# Patient Record
Sex: MALE | Race: WHITE
[De-identification: names, ages, dates, MRNs, and addresses within clinical notes are randomized per-mention and may not be internally consistent; named-entity substitution may affect disease eponyms.]

---

## 2017-02-23 NOTE — XMS REPORT
Continuity of Care Document

 Created on: 12/15/2014



NEIL GOODSON

External Reference #: [id number]

: 1960

Sex: Male



Demographics







 Address  [address] [place]  [address]

 

 Home Phone  [phone]

 

 Preferred Language  English

 

 Marital Status  Unknown

 

 Tenriism Affiliation  Unknown

 

 Race  Unknown

 

 Ethnic Group  Unknown





Author







 Author  [facility]

 

 Organization  [facility]

 

 Address  Unknown

 

 Phone  Unavailable







Support







 Name  Relationship  Address  Phone

 

 LUIS FERNANDO ZULUAGA MD  Caregiver  [address] [place]  [address] [phone]

 

 GABRIEL MCKEON MD  Caregiver  [address] [place]  [address] [phone]

 

 MADDIE YE MD  Caregiver  [address] [place] [address]  [phone]

 

 EVELIO GOODSON  Next Of Kin  [address] [place], [place]  [phone]







Care Team Providers







 Care Team Member Name  Role  Phone

 

 GABRIEL MCKEON MD  PCP  [phone]







Insurance Providers







 Payer Name  Policy Number  Subscriber Name  Relationship

 

 [employer]  [id number]  Neil Goodson  18 Self / Same As Patient

 

 [facility]  [id number]  Neil Goodson  18 Self / Same As Patient







Advance Directives







 Directive  Response  Recorded Date/Time

 

 Advance Directives  No  14 9:09am

 

 Health Care Power of   No  14 9:09am

 

 Organ Donor  No  14 9:09am

 

 Resuscitation Status  Full Code  14 9:09am







Problems

Medical Problems





 Problem  Onset Date  Status

 

 muscle spasm back  Unknown  Active

 

 Conjunctivitis  Unknown  Active







Medications







 Medication  Dose  Route  Sig  Days/Qty  Instructions  Order Date  Discontinued 
Date  Status

 

 Duloxetine HCl  60 Mg  PO  DAILY        11     Active

 

 Ranitidine Hcl  150 Mg  PO  BEDTIME        11  Discontinued

 

 Tramadol Hcl  50 Mg  PO           11  Discontinued

 

 HCTZ/Lisinopril (Zestoretic)  1 Each  PO  DAILY        11  
Discontinued

 

 Dipyridamole/Aspirin  1 Ea  PO  TWICE A DAY        11  
Discontinued

 

 Meclizine HCl  1 Each  PO  QID PRN  20 Qty     11  Discontinued

 

 Acetaminophen/Hydrocodone Bitart (Norco)  1 Each  PO  Q4HR PRN        12  Discontinued

 

 Tramadol Hcl  150 Mg  PO  THREE TIMES A DAY PRN     TAKES 3 TABS THREE TIMES A 
DAY PRN HEADACHE  12     Active

 

 Aspirin  81 Mg  PO  DAILY        12     Active

 

 Omeprazole  20 Mg  PO  DAILY        12  Discontinued

 

 Fish Oil  1,000 Mg  PO  DAILY        12  Discontinued

 

 Lisinopril  20 Mg  PO  DAILY        12     Active

 

 [Leg cramps]  1 Tab  PO  DAILY PRN        12  Discontinued

 

 Atorvastatin Calcium  20 Mg  PO  DAILY        12     Active

 

 Clopidogrel Bisulfate  75 Mg  PO  DAILY        12     Active

 

 Isosorbide Mononitrate  30 Mg  PO  DAILY        12     Active

 

 Magnesium Oxide  400 Mg  PO  TWICE A DAY        12  
Discontinued

 

 Esomeprazole Magnesium  40 Mg  PO  DAILY        13  
Discontinued

 

 Metformin HCl (Glucophage)  500 Mg  PO  TWICE A DAY WITH MEALS        13
     Active

 

 Dexlansoprazole  30 Mg  PO  DAILY        13     Active

 

 HCTZ/Metoprolol Tartrate (Lopressor Hct)  1 Tab  PO  DAILY     50-25mg       Active

 

 Fa/Multivits-Min/Lycopene/Lut  1 Tab  PO  DAILY        13  
Discontinued

 

 Hydrocodone Bit/Acetaminophen  2 Tab  PO  THREE TIMES A DAY PRN      5-500MG  
13     Active

 

 Albuterol  2 Puff  IH  FOUR TIMES DAILY PRN        13  
Discontinued

 

 Acetaminophen/Hydrocodone Bitart  1 - 2 Each  PO  Q4HR PRN  30 Qty     14  Discontinued

 

 Cyclobenzaprine HCl (Flexeril)  1 Each  PO  Q8HR PRN PRN SPASMS  10 Qty          Active

 

 Erythromycin  0  OS  FOUR TIMES DAILY  5 Days  APPLY THIN RIBBON  14     
Active







Social History







 Social History Problem  Response  Recorded Date/Time

 

 Alcohol Use  Rarely Uses  2014 9:09am

 

 Recreational Drug Use  N THC by Recall Hx  2014 9:09am

 

 Recent Foreign Travel  No  2014 9:07am

 

 Smoking Status  Current Everyday Smoker  2014 9:09am









 Query  Response  Start Date  Stop Date

 

 Smoking Status  Current Everyday Smoker      







Hospital Discharge Instructions

No hospital discharge instructions.



Plan of Care

No plan of care.



Functional Status

No functional status results.



Allergies, Adverse Reactions, Alerts







 Allergen  Type  Severity  Reaction  Status  Last Updated

 

 No Known Drug Allergies           Active  11







Immunizations







 Name  Given  Type

 

 Date of Pneumonia Vaccine  14  Historical

 

 Date of Influenza Vaccine  14  Historical







Vital Signs

Acute Vital Signs





 Vital  Response  Date/Time

 

 Temperature (Fahrenheit)  98.1 degrees F (97.6 - 99.5)   

 

 Temperature (Calculated Celsius)  36.41672 degrees C (36.4 - 37.5)   

 

 Temperature Source  Temporal     

 

 Pulse Rate (adult)  113 bpm (60 - 90)   

 

 Respiratory Rate  20 bpm (12 - 24)   

 

 O2 Sat by Pulse Oximetry  98 % (88 - 100)   

 

 Blood Pressure  108/90 mm Hg   

 

 Pain      

 

    Pain Intensity  9     

 

 Height (Feet)  5 feet    

 

 Height (Inches)  9.00 inches    

 

 Height (Calculated Centimeters)  175.356419 cm    

 

 Weight (Pounds)  208 pounds    

 

 Weight (Calculated Grams)  80115.214 gm    

 

 Weight (Calculated Kilograms)  94.391205 kilograms    

 

 Calculated BMI  28.12     







Results

Laboratory Results







 Test Name  Result  Units  Flags  Reference  Collection Date/Time  Result Date/
Time  Comments

 

 White Blood Count  5.8  10^3/uL     4.3-11.0  2014 1:10pm  2014 1:
27pm   

 

 Red Blood Count  5.04  10^6/uL     4.35-5.85  2014 1:10pm  2014 1:
27pm   

 

 Hemoglobin  15.0  G/DL     13.3-17.7  2014 1:10pm  2014 1:27pm   

 

 Hematocrit  43  %     40-54  2014 1:10pm  2014 1:27pm   

 

 Mean Corpuscular Volume  86  FL     80-99  2014 1:10pm  2014 1:
27pm   

 

 Mean Corpuscular Hemoglobin  30  PG     25-34  2014 1:10pm  2014 1:
27pm   

 

 Mean Corpuscular Hemoglobin Concent  35  G/DL     32-36  2014 1:10pm  2014 1:27pm   

 

 Red Cell Distribution Width  13.2  %     10.0-14.5  2014 1:10pm  12/05/
2014 1:27pm   

 

 Platelet Count  233  10^3/uL     130-400  2014 1:10pm  2014 1:27pm
   

 

 Mean Platelet Volume  10.0  FL     7.4-10.4  2014 1:10pm  2014 1:
27pm   

 

 Neutrophils (%) (Auto)  62  %     42-75  2014 1:10pm  2014 1:27pm 
  

 

 Lymphocytes (%) (Auto)  26  %     12-44  2014 1:10pm  2014 1:27pm 
  

 

 Monocytes (%) (Auto)  8  %     0-12  2014 1:10pm  2014 1:27pm   

 

 Eosinophils (%) (Auto)  4  %     0-10  2014 1:10pm  2014 1:27pm   

 

 Basophils (%) (Auto)  0  %     0-10  2014 1:10pm  2014 1:27pm   

 

 Neutrophils # (Auto)  3.6  X 10^3     1.8-7.8  2014 1:10pm  2014 1:
27pm   

 

 Lymphocytes # (Auto)  1.5  X 10^3     1.0-4.0  2014 1:10pm  2014 1:
27pm   

 

 Monocytes # (Auto)  0.5  X 10^3     0.0-1.0  2014 1:10pm  2014 1:
27pm   

 

 Eosinophils # (Auto)  0.2  10^3/uL     0.0-0.3  2014 1:10pm  2014 1
:27pm   

 

 Basophils # (Auto)  0.0  10^3/uL     0.0-0.1  2014 1:10pm  2014 1:
27pm   

 

 Sodium Level  135  MMOL/L     135-145  2014 1:10pm  2014 1:51pm   

 

 Potassium Level  3.9  MMOL/L     3.6-5.0  2014 1:10pm  2014 1:51pm
   

 

 Chloride Level  99  MMOL/L       2014 1:10pm  2014 1:51pm   

 

 Carbon Dioxide Level  29  MMOL/L     21-32  2014 1:10pm  2014 1:
51pm   

 

 Blood Urea Nitrogen  15  MG/DL     7-18  2014 1:10pm  2014 1:51pm 
  

 

 Creatinine  0.83  MG/DL     0.60-1.30  2014 1:10pm  2014 1:51pm   

 

 BUN/Creatinine Ratio  18           2014 1:10pm  2014 1:51pm   

 

 Estimat Glomerular Filtration Rate  > 60           2014 1:10pm  2014 1:51pm                    GFR INTERPRETIVE DATA



         UNITS FOR ESTIMATED GFR (eGFR): mL/min/1.73 M2

         REFERENCE RANGE FOR ESTIMATED GFR (eGFR)

                                          eGFR

         NORMAL eGFR                       >60

         MODERATELY DECREASED eGFR          30-59

         SEVERLY DECREASED eGFR             15-29

         KIDNEY FAILURE                    <15 (OR DIALYSIS)

 

 Glucose Level  111  MG/DL  H    2014 1:10pm  2014 1:51pm   

 

 Calcium Level  9.1  MG/DL     8.5-10.1  2014 1:10pm  2014 1:51pm   







Procedures







 Procedure  Status  Date  Provider(s)

 

 Tracing only of electrocardiogram  completed  14  JOE MURILLO DO

 

 Color Doppler echocardiography  completed  14  MORIAH ROBERTS MD







Encounters







 Encounter  Location  Date/Time

 

 Departed Emergency Room  [facility]  14 8:51am

 

 Registered Clinic  [facility]  14 8:51am

 

 Registered Clinic  [facility]  14 12:44pm











 Recent Diagnosis

## 2017-11-26 NOTE — DIAGNOSTIC IMAGING REPORT
INDICATION: Back pain which radiates into the left lower

extremity.



AP and lateral views of the lumbar spine are obtained.



FINDINGS: Lumbar spinal curvature is unremarkable. There is

moderate narrowing of the L5-S1 disc space with associated

endplate spurring and probable grade 1 retrolisthesis of L5 on

S1. No definite acute fracture is identified.



IMPRESSION: Localized L5-S1 degenerative disc disease with grade

1 retrolisthesis of L5 on S1. Otherwise, there is no acute

lumbar spinal abnormality identified.



Dictated by:



Dictated on workstation # [id number]
Thoracic spine.



INDICATION: Back pain.



FINDINGS: AP, lateral and swimmer's views were obtained. There

are no prior thoracic spine examinations available for

comparison. The MRI thoracic spine exam of 02/03/2014 failed to

show any sign of an acute abnormality. The prior exam did reveal

mild spinal canal stenosis at T10-T11.



The AP view shows mild curvature of the thoracic spine, convex

to the left. This is somewhat more pronounced than noted on the

prior chest exam of 06/04/2016 and this finding may be partly

related to positioning. The vertebral body heights are within

normal limits and there is no fracture or acute bony abnormality

evident. There is degenerative disc and bony disease involving

the mid and lower thoracic spine. There is no sign of a

paraspinal mass.



IMPRESSION:

1. There is no evidence for an acute bony abnormality.

2. If clinical concern regarding an underlying abnormality

persists, then a repeat MRI thoracic spine exam would be

recommended for further study.



Dictated by:



Dictated on workstation # [id number]
- - -

## 2018-12-30 ENCOUNTER — HOSPITAL ENCOUNTER (EMERGENCY)
Dept: HOSPITAL 75 - ER | Age: 58
Discharge: HOME | End: 2018-12-30
Payer: MEDICARE

## 2018-12-30 VITALS — WEIGHT: 210 LBS | HEIGHT: 73 IN | BODY MASS INDEX: 27.83 KG/M2

## 2018-12-30 VITALS — DIASTOLIC BLOOD PRESSURE: 93 MMHG | SYSTOLIC BLOOD PRESSURE: 143 MMHG

## 2018-12-30 DIAGNOSIS — F41.9: ICD-10-CM

## 2018-12-30 DIAGNOSIS — G43.909: ICD-10-CM

## 2018-12-30 DIAGNOSIS — Z79.82: ICD-10-CM

## 2018-12-30 DIAGNOSIS — F32.9: ICD-10-CM

## 2018-12-30 DIAGNOSIS — Z95.820: ICD-10-CM

## 2018-12-30 DIAGNOSIS — E11.9: ICD-10-CM

## 2018-12-30 DIAGNOSIS — R07.89: ICD-10-CM

## 2018-12-30 DIAGNOSIS — I25.10: ICD-10-CM

## 2018-12-30 DIAGNOSIS — E78.00: ICD-10-CM

## 2018-12-30 DIAGNOSIS — K56.7: ICD-10-CM

## 2018-12-30 DIAGNOSIS — K21.9: ICD-10-CM

## 2018-12-30 DIAGNOSIS — I10: ICD-10-CM

## 2018-12-30 DIAGNOSIS — Z98.890: ICD-10-CM

## 2018-12-30 DIAGNOSIS — Z90.49: ICD-10-CM

## 2018-12-30 DIAGNOSIS — Z95.5: ICD-10-CM

## 2018-12-30 DIAGNOSIS — F17.210: ICD-10-CM

## 2018-12-30 DIAGNOSIS — I25.2: ICD-10-CM

## 2018-12-30 DIAGNOSIS — N39.0: Primary | ICD-10-CM

## 2018-12-30 DIAGNOSIS — J44.9: ICD-10-CM

## 2018-12-30 LAB
ALBUMIN SERPL-MCNC: 5 GM/DL (ref 3.2–4.5)
ALP SERPL-CCNC: 101 U/L (ref 40–136)
ALT SERPL-CCNC: 37 U/L (ref 0–55)
AMORPH SED URNS QL MICRO: (no result) /LPF
APTT BLD: 27 SEC (ref 24–35)
APTT PPP: (no result) S
BACTERIA #/AREA URNS HPF: (no result) /HPF
BARBITURATES UR QL: NEGATIVE
BASOPHILS # BLD AUTO: 0 10^3/UL (ref 0–0.1)
BASOPHILS NFR BLD AUTO: 0 % (ref 0–10)
BENZODIAZ UR QL SCN: NEGATIVE
BILIRUB SERPL-MCNC: 0.6 MG/DL (ref 0.1–1)
BILIRUB UR QL STRIP: (no result)
BUN/CREAT SERPL: 13
CALCIUM SERPL-MCNC: 10.3 MG/DL (ref 8.5–10.1)
CAOX CRY #/AREA URNS LPF: (no result) /LPF
CHLORIDE SERPL-SCNC: 104 MMOL/L (ref 98–107)
CO2 SERPL-SCNC: 22 MMOL/L (ref 21–32)
COCAINE UR QL: NEGATIVE
CREAT SERPL-MCNC: 1.35 MG/DL (ref 0.6–1.3)
EOSINOPHIL # BLD AUTO: 0 10^3/UL (ref 0–0.3)
EOSINOPHIL NFR BLD AUTO: 0 % (ref 0–10)
ERYTHROCYTE [DISTWIDTH] IN BLOOD BY AUTOMATED COUNT: 13.4 % (ref 10–14.5)
FIBRINOGEN PPP-MCNC: (no result) MG/DL
GFR SERPLBLD BASED ON 1.73 SQ M-ARVRAT: 54 ML/MIN
GLUCOSE SERPL-MCNC: 116 MG/DL (ref 70–105)
GLUCOSE UR STRIP-MCNC: (no result) MG/DL
HCT VFR BLD CALC: 51 % (ref 40–54)
HGB BLD-MCNC: 17 G/DL (ref 13.3–17.7)
INR PPP: 0.9 (ref 0.8–1.4)
KETONES UR QL STRIP: (no result)
LEUKOCYTE ESTERASE UR QL STRIP: (no result)
LYMPHOCYTES # BLD AUTO: 0.6 X 10^3 (ref 1–4)
LYMPHOCYTES NFR BLD AUTO: 6 % (ref 12–44)
MAGNESIUM SERPL-MCNC: 2.2 MG/DL (ref 1.8–2.4)
MANUAL DIFFERENTIAL PERFORMED BLD QL: YES
MCH RBC QN AUTO: 29 PG (ref 25–34)
MCHC RBC AUTO-ENTMCNC: 34 G/DL (ref 32–36)
MCV RBC AUTO: 86 FL (ref 80–99)
METHADONE UR QL SCN: NEGATIVE
METHAMPHETAMINE SCREEN URINE S: NEGATIVE
MONOCYTES # BLD AUTO: 1 X 10^3 (ref 0–1)
MONOCYTES NFR BLD AUTO: 9 % (ref 0–12)
MONOCYTES NFR BLD: 7 %
MYOGLOBIN SERPL-MCNC: 87.7 NG/ML (ref 10–92)
NEUTROPHILS # BLD AUTO: 8.7 X 10^3 (ref 1.8–7.8)
NEUTROPHILS NFR BLD AUTO: 85 % (ref 42–75)
NEUTS BAND NFR BLD MANUAL: 89 %
NITRITE UR QL STRIP: POSITIVE
OPIATES UR QL SCN: NEGATIVE
OXYCODONE UR QL: NEGATIVE
PH UR STRIP: 5 [PH] (ref 5–9)
PLATELET # BLD: 218 10^3/UL (ref 130–400)
PMV BLD AUTO: 10.4 FL (ref 7.4–10.4)
POTASSIUM SERPL-SCNC: 4.3 MMOL/L (ref 3.6–5)
PROPOXYPH UR QL: NEGATIVE
PROT SERPL-MCNC: 8.9 GM/DL (ref 6.4–8.2)
PROT UR QL STRIP: (no result)
PROTHROMBIN TIME: 12.5 SEC (ref 12.2–14.7)
RBC # BLD AUTO: 5.91 10^6/UL (ref 4.35–5.85)
RBC #/AREA URNS HPF: (no result) /HPF
RBC MORPH BLD: NORMAL
SODIUM SERPL-SCNC: 138 MMOL/L (ref 135–145)
SP GR UR STRIP: 1.02 (ref 1.02–1.02)
SQUAMOUS #/AREA URNS HPF: (no result) /HPF
TRICYCLICS UR QL SCN: NEGATIVE
UROBILINOGEN UR-MCNC: 1 MG/DL
VARIANT LYMPHS NFR BLD MANUAL: 4 %
WBC # BLD AUTO: 10.3 10^3/UL (ref 4.3–11)
WBC #/AREA URNS HPF: (no result) /HPF

## 2018-12-30 PROCEDURE — 84484 ASSAY OF TROPONIN QUANT: CPT

## 2018-12-30 PROCEDURE — 85027 COMPLETE CBC AUTOMATED: CPT

## 2018-12-30 PROCEDURE — 87088 URINE BACTERIA CULTURE: CPT

## 2018-12-30 PROCEDURE — 85730 THROMBOPLASTIN TIME PARTIAL: CPT

## 2018-12-30 PROCEDURE — 71045 X-RAY EXAM CHEST 1 VIEW: CPT

## 2018-12-30 PROCEDURE — 81000 URINALYSIS NONAUTO W/SCOPE: CPT

## 2018-12-30 PROCEDURE — 93041 RHYTHM ECG TRACING: CPT

## 2018-12-30 PROCEDURE — 83690 ASSAY OF LIPASE: CPT

## 2018-12-30 PROCEDURE — 83735 ASSAY OF MAGNESIUM: CPT

## 2018-12-30 PROCEDURE — 36415 COLL VENOUS BLD VENIPUNCTURE: CPT

## 2018-12-30 PROCEDURE — 80053 COMPREHEN METABOLIC PANEL: CPT

## 2018-12-30 PROCEDURE — 74177 CT ABD & PELVIS W/CONTRAST: CPT

## 2018-12-30 PROCEDURE — 85610 PROTHROMBIN TIME: CPT

## 2018-12-30 PROCEDURE — 80306 DRUG TEST PRSMV INSTRMNT: CPT

## 2018-12-30 PROCEDURE — 71275 CT ANGIOGRAPHY CHEST: CPT

## 2018-12-30 PROCEDURE — 85007 BL SMEAR W/DIFF WBC COUNT: CPT

## 2018-12-30 PROCEDURE — 83874 ASSAY OF MYOGLOBIN: CPT

## 2018-12-30 NOTE — DIAGNOSTIC IMAGING REPORT
EXAMINATION: Chest radiograph, portable AP view.



DATE: December 30, 2018 at 1047 hours.



INDICATION: 58-year-old male, chest pain.



COMPARISON:  June 4, 2016.



FINDINGS: Heart size and mediastinal contours are unchanged and

unremarkable. There is no identified pneumothorax. There is no

large pleural effusion. There is no identified focal airspace

consolidation.



IMPRESSION: No identified acute cardiopulmonary abnormality.



Dictated by: 



  Dictated on workstation # [id number]

## 2018-12-30 NOTE — DIAGNOSTIC IMAGING REPORT
Exam: CT chest, abdomen and pelvis with intravenous contrast.



Date: December 30, 2018.



Indication: 58-year-old male, chest and abdominal pain. Diarrhea.



Comparison: Chest radiograph December 30, 2018. CT abdomen and

pelvis May 6, 2013.



Technique: Axial CT images at the level of the chest abdomen and

pelvis were obtained following the intravenous administration of

contrast. Coronal and sagittal reformats were obtained and

provided.



Findings:



There are upper lobe predominant changes of emphysema. There is a

3 mm right upper lobe pulmonary nodule on axial image 75. There

is no additional identified pulmonary nodule or lung mass. There

is very mild dependent atelectasis in the lower lobes. There is

no additional focal airspace consolidation. There is no

pneumothorax. There is no pleural effusion. The central airways

are patent.



There is no identified large central pulmonary embolus. There is

limited evaluation for segmental and subsegmental pulmonary

emboli given the timing of the contrast bolus. The main pulmonary

artery is normal in caliber. The heart is not enlarged. There is

no pericardial effusion. There is no identified abnormally

enlarged mediastinal, hilar, or axillary lymph node which meets

CT size criteria for adenopathy.



The liver is normal in size and contour. There is no identified

liver lesion. The main, right, and left portal veins are patent.

The patient is status post cholecystectomy. There is no

intrahepatic or extrahepatic bile duct dilation. The main

pancreatic duct is not abnormally dilated. Unremarkable

appearance of the pancreatic parenchyma. The spleen is normal in

size. The adrenal glands are unremarkable.



There is a exophytic low-attenuation right renal lesion on axial

image 32 compatible with benign cyst measuring 4.8 cm in size.

There are smaller additional right renal lesions compatible with

benign cyst. There are subcentimeter low-attenuation lesions in

the left kidney too small to characterize. The urinary collecting

systems are not distended. There is no identified renal or

ureteral stone. The urinary bladder is underdistended and grossly

unremarkable in appearance.



There is a small fat-containing right inguinal hernia. There is

diverticulosis without evidence of acute diverticulitis. There

are fluid-filled distended segments of mid to distal small bowel

which measure up to 3.8 cm in diameter. There is no clearly

identified transition point in bowel caliber. There is no free

intraperitoneal air. There is no drainable fluid collection.

There is no free pelvic fluid. There is no pneumatosis or portal

venous gas.



There are atherosclerotic calcifications. There is no identified

abnormally enlarged lymph node within the abdomen or pelvis which

meets CT size criteria for adenopathy.



There are multilevel degenerative changes of the spine. There is

no identified acute bony abnormality.



Impression:

1. Abnormally dilated fluid-filled segments of mid to distal

small bowel without clearly identified focal transition point and

bowel caliber. Findings potentially may relate to a reactive

ileus. Low-grade partial distal small bowel obstruction would be

difficult to exclude.

2. Diverticulosis without evidence of acute diverticulitis.

3. No acute cardiopulmonary abnormality.



Dictated by: 



  Dictated on workstation # [id number]
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
## 2021-04-01 ENCOUNTER — HOSPITAL ENCOUNTER (EMERGENCY)
Dept: HOSPITAL 75 - ER | Age: 61
LOS: 1 days | Discharge: HOME | End: 2021-04-02
Payer: MEDICARE

## 2021-04-01 VITALS — DIASTOLIC BLOOD PRESSURE: 71 MMHG | SYSTOLIC BLOOD PRESSURE: 139 MMHG

## 2021-04-01 VITALS — BODY MASS INDEX: 31.14 KG/M2 | HEIGHT: 72.01 IN | WEIGHT: 229.94 LBS

## 2021-04-01 DIAGNOSIS — I10: ICD-10-CM

## 2021-04-01 DIAGNOSIS — Z79.82: ICD-10-CM

## 2021-04-01 DIAGNOSIS — I25.2: ICD-10-CM
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
## 2021-04-01 DIAGNOSIS — E78.00: ICD-10-CM

## 2021-04-01 DIAGNOSIS — Z79.84: ICD-10-CM

## 2021-04-01 DIAGNOSIS — Z77.22: ICD-10-CM

## 2021-04-01 DIAGNOSIS — S09.90XA: Primary | ICD-10-CM

## 2021-04-01 DIAGNOSIS — E11.9: ICD-10-CM

## 2021-04-01 DIAGNOSIS — F41.9: ICD-10-CM

## 2021-04-01 DIAGNOSIS — J44.9: ICD-10-CM

## 2021-04-01 DIAGNOSIS — K21.9: ICD-10-CM

## 2021-04-01 DIAGNOSIS — Z95.5: ICD-10-CM

## 2021-04-01 DIAGNOSIS — S00.83XA: ICD-10-CM

## 2021-04-01 DIAGNOSIS — I25.10: ICD-10-CM

## 2021-04-01 DIAGNOSIS — F32.9: ICD-10-CM

## 2021-04-01 PROCEDURE — 99283 EMERGENCY DEPT VISIT LOW MDM: CPT

## 2021-04-01 NOTE — ED FALL/INJURY
General


Stated Complaint:  BUMP ON BACK OF HEAD, INJURY TO FOREHEAD


Source:  patient


Exam Limitations:  no limitations





History of Present Illness


Date Seen by Provider:  Apr 1, 2021


Time Seen by Provider:  23:40


Initial Comments


Patient is a 60-year-old male who presents to the emergency department [facility] 

with a chief complaint of head injury.  Patient states 2 weeks ago he stood up 

too fast and "blacked out" and fell backwards and hit his head.  He is 

complaining of significant pain to the posterior scalp.  He also states that he 

walked into the door of his camper shell today and hit his forehead.  He states 

he has had a little memory difficulty over the course of the last couple of 

days.  He is forgetting things that he is known all his life.  He denies any 

double vision, blurry vision, nausea or vomiting.  He states that the posterior 

part of his scalp is very tender and sore and hurts to lay on it.  He denies any

symptoms of illness such as fevers, chills, cough or congestion.  He states he 

has a chronic "smoker's cough".  He states he has a "bad spine" but denies any 

new neck pain is related to his fall.


No numbness, tingling or weakness to any of his extremities.  No problems with 

gait.  He states he would just like something for pain because his head is 

hurting in the posterior aspect.





All other review of systems reviewed and negative except as stated above.


Occurred:  other (2 weeks ago and today)


Severity:  moderate


Injuries/Pain Location:  head


Context:  lightheaded


Loss of Consciousness:  brief (seconds)


Associated Symptoms (Fall):  Denies Symptoms





Allergies and Home Medications


Allergies


Coded Allergies:  


     No Known Drug Allergies (Unverified , 9/21/11)





Home Medications


Aspirin 325 Mg Tablet.dr, 325 MG PO DAILY


   Prescribed by: ARJUN JACOBO on 12/30/15 1008


Atorvastatin Calcium 10 Mg Tablet, 10 MG PO HS


   Prescribed by: ARJUN JACOBO on 12/30/15 1008


Cefuroxime Axetil 250 Mg Tablet, 250 MG PO BID


   Prescribed by: JOYA JETER on 12/30/18 1336


Lisinopril 10 Mg Tablet, 10 MG PO DAILY


   Prescribed by: ARJUN JACOBO on 12/30/15 1009


Metoclopramide HCl 10 Mg Tablet, 10 MG PO TID


   Prescribed by: JOYA JETER on 12/30/18 1426


Metoprolol Succinate 25 Mg Tab.er.24h, 25 MG PO DAILY


   Prescribed by: ARJUN JACOBO on 12/30/15 1008





Patient Home Medication List


Home Medication List Reviewed:  Yes





Review of Systems


Review of Systems


Constitutional:  see HPI


Eyes:  No Symptoms Reported


Ears, Nose, Mouth, Throat:  no symptoms reported


Respiratory:  no symptoms reported


Cardiovascular:  no symptoms reported


Gastrointestinal:  no symptoms reported


Genitourinary:  no symptoms reported


Musculoskeletal:  no symptoms reported


Skin:  other (Tenderness to an area at the back of the scalp)





All Other Systems Reviewed


Negative Unless Noted:  Yes





Past Medical-Social-Family Hx


Patient Social History


Type Used:  Cigarettes


2nd Hand Smoke Exposure:  Yes


Recent Hopitalizations:  Yes (HE)





Immunizations Up To Date


Tetanus Booster (TDap):  More than 5yrs


PED Vaccines UTD:  No


Date of Pneumonia Vaccine:  Nov 3, 2014


Date of Influenza Vaccine:  Sep 2, 2015





Seasonal Allergies


Seasonal Allergies:  No





Past Medical History


Surgeries:  Yes (CARDIAC STENTS X4, STENT IN R LEG- PLACED AT [place], RIGHT 

INGUINAL HERNIA)


Abdominal, Appendectomy, Cardiac, Coronary Stent, Gallbladder, Vascular Surgery


Respiratory:  Yes


COPD


Cardiac:  Yes (STENTS X2, TACHYCARDIA)


Coronary Artery Disease, Heart Attack, High Cholesterol, Hypertension


Neurological:  Yes (HEAD INJURY)


Concussion, Headaches /Migraines


Reproductive Disorders:  No


Gastrointestinal:  Yes


Gastroesophageal Reflux


Musculoskeletal:  Yes


Chronic Back Pain


Endocrine:  Yes (Pt states he "is not a diabetic" but takes Metformin)


Diabetes, Non-Insulin dep


Cancer:  No


Psychosocial:  Yes (? UNDETERMINED PYSCH HX)


Anxiety, Depression


Integumentary:  No


Blood Disorders:  No


Adverse Reaction/Blood Tranf:  No





Family Medical History


No Pertinent Family Hx





Physical Exam


Vital Signs





Vital Signs - First Documented








 4/1/21





 23:46


 


Temp 36.8


 


Pulse 100


 


Resp 18


 


B/P (MAP) 139/71 (93)


 


Pulse Ox 96


 


O2 Delivery Room Air





Capillary Refill :


Height, Weight, BMI


Height: 6'1.00"


Weight: 210lbs. 0.0oz. 95.660399la;  BMI


Method:Stated


General Appearance:  WD/WN, no apparent distress


HEENT:  PERRL/EOMI, normal ENT inspection, TMs normal, pharynx normal


Neck:  non-tender, full range of motion


Cardiovascular:  regular rate, rhythm


Respiratory:  no respiratory distress, no accessory muscle use


Extremities:  normal range of motion, non-tender, normal inspection


Neurologic/Psychiatric:  alert, normal mood/affect, oriented x 3


Skin:  normal color, warm/dry, other (Patient has a small contusion palpable at 

the occiput.  He also has a very minor abrasion over the right eyebrow on the 

forehead.)





Progress/Results/Core Measures


Results/Orders


My Orders





Orders - LENKA BAL MD


Ibuprofen  Tablet (Motrin  Tablet) (4/2/21 00:00)





Vital Signs/I&O











 4/1/21





 23:46


 


Temp 36.8


 


Pulse 100


 


Resp 18


 


B/P (MAP) 139/71 (93)


 


Pulse Ox 96


 


O2 Delivery Room Air











Progress


Progress Note :  


   Time:  23:55


Progress Note


Patient is a 60-year-old male who presents with a chief complaint of head injury

today.  Evaluation includes physical exam.  Patient is 2 weeks post syncopal 

episode after standing up too fast.  He is complaining of posterior scalp pain. 

He is treated with some ibuprofen here in the emergency department.  He is given

reassurance.  Patient is not on any blood thinners and does not have any acute 

neurologic deficit noted on physical exam.  Patient will be discharged home to 

follow-up with his primary care physician.





Departure


Impression





   Primary Impression:  


   Minor head injury


   Qualified Codes:  S09.90XA - Unspecified injury of head, initial encounter


Disposition:  01 HOME, SELF-CARE


Condition:  Stable





Departure-Patient Inst.


Decision time for Depature:  23:56


Referrals:  


[facility] (PCP/Family)


Primary Care Physician


Patient Instructions:  Minor Head Injury (DC)





Add. Discharge Instructions:  


Take over-the-counter Tylenol or ibuprofen as needed for head pain.





Come back to the emergency room if you have worsening headache especially 

associated with vomiting, double vision or blurry vision or any other emergent 

concerning symptoms.





Please follow-up with [facility] as needed and scheduled.











LENKA BAL MD          Apr 1, 2021 23:57

## 2021-07-29 ENCOUNTER — HOSPITAL ENCOUNTER (EMERGENCY)
Dept: HOSPITAL 75 - ER | Age: 61
Discharge: HOME | End: 2021-07-29
Payer: MEDICARE

## 2021-07-29 VITALS — HEIGHT: 71.85 IN | BODY MASS INDEX: 32.57 KG/M2 | WEIGHT: 237.88 LBS

## 2021-07-29 VITALS — SYSTOLIC BLOOD PRESSURE: 195 MMHG | DIASTOLIC BLOOD PRESSURE: 119 MMHG

## 2021-07-29 DIAGNOSIS — I25.2: ICD-10-CM

## 2021-07-29 DIAGNOSIS — Z87.820: ICD-10-CM

## 2021-07-29 DIAGNOSIS — I25.10: ICD-10-CM

## 2021-07-29 DIAGNOSIS — Z79.82: ICD-10-CM

## 2021-07-29 DIAGNOSIS — E78.00: ICD-10-CM

## 2021-07-29 DIAGNOSIS — J44.9: ICD-10-CM

## 2021-07-29 DIAGNOSIS — E11.9: ICD-10-CM

## 2021-07-29 DIAGNOSIS — Z79.899: ICD-10-CM

## 2021-07-29 DIAGNOSIS — I10: Primary | ICD-10-CM

## 2021-07-29 LAB
ALBUMIN SERPL-MCNC: 4.2 GM/DL (ref 3.2–4.5)
ALP SERPL-CCNC: 99 U/L (ref 40–136)
ALT SERPL-CCNC: 72 U/L (ref 0–55)
APTT BLD: 25 SEC (ref 24–35)
BASOPHILS # BLD AUTO: 0.1 10^3/UL (ref 0–0.1)
BASOPHILS NFR BLD AUTO: 1 % (ref 0–10)
BILIRUB SERPL-MCNC: 0.3 MG/DL (ref 0.1–1)
BUN/CREAT SERPL: 10
CALCIUM SERPL-MCNC: 9 MG/DL (ref 8.5–10.1)
CHLORIDE SERPL-SCNC: 104 MMOL/L (ref 98–107)
CO2 SERPL-SCNC: 25 MMOL/L (ref 21–32)
CREAT SERPL-MCNC: 0.89 MG/DL (ref 0.6–1.3)
EOSINOPHIL # BLD AUTO: 0.4 10^3/UL (ref 0–0.3)
EOSINOPHIL NFR BLD AUTO: 4 % (ref 0–10)
GFR SERPLBLD BASED ON 1.73 SQ M-ARVRAT: 87 ML/MIN
GLUCOSE SERPL-MCNC: 89 MG/DL (ref 70–105)
HCT VFR BLD CALC: 48 % (ref 40–54)
HGB BLD-MCNC: 16.1 G/DL (ref 13.3–17.7)
INR PPP: 0.9 (ref 0.8–1.4)
LYMPHOCYTES # BLD AUTO: 2.1 10^3/UL (ref 1–4)
LYMPHOCYTES NFR BLD AUTO: 26 % (ref 12–44)
MAGNESIUM SERPL-MCNC: 1.9 MG/DL (ref 1.6–2.4)
MANUAL DIFFERENTIAL PERFORMED BLD QL: NO
MCH RBC QN AUTO: 30 PG (ref 25–34)
MCHC RBC AUTO-ENTMCNC: 33 G/DL (ref 32–36)
MCV RBC AUTO: 89 FL (ref 80–99)
MONOCYTES # BLD AUTO: 0.7 10^3/UL (ref 0–1)
MONOCYTES NFR BLD AUTO: 9 % (ref 0–12)
NEUTROPHILS # BLD AUTO: 4.9 10^3/UL (ref 1.8–7.8)
NEUTROPHILS NFR BLD AUTO: 60 % (ref 42–75)
PLATELET # BLD: 201 10^3/UL (ref 130–400)
PMV BLD AUTO: 10.5 FL (ref 9–12.2)
POTASSIUM SERPL-SCNC: 4.2 MMOL/L (ref 3.6–5)
PROT SERPL-MCNC: 7.6 GM/DL (ref 6.4–8.2)
PROTHROMBIN TIME: 12.8 SEC (ref 12.2–14.7)
SODIUM SERPL-SCNC: 140 MMOL/L (ref 135–145)
WBC # BLD AUTO: 8.2 10^3/UL (ref 4.3–11)

## 2021-07-29 PROCEDURE — 85610 PROTHROMBIN TIME: CPT

## 2021-07-29 PROCEDURE — 85730 THROMBOPLASTIN TIME PARTIAL: CPT

## 2021-07-29 PROCEDURE — 85025 COMPLETE CBC W/AUTO DIFF WBC: CPT

## 2021-07-29 PROCEDURE — 93005 ELECTROCARDIOGRAM TRACING: CPT

## 2021-07-29 PROCEDURE — 83874 ASSAY OF MYOGLOBIN: CPT

## 2021-07-29 PROCEDURE — 36415 COLL VENOUS BLD VENIPUNCTURE: CPT

## 2021-07-29 PROCEDURE — 93041 RHYTHM ECG TRACING: CPT

## 2021-07-29 PROCEDURE — 80053 COMPREHEN METABOLIC PANEL: CPT

## 2021-07-29 PROCEDURE — 83735 ASSAY OF MAGNESIUM: CPT

## 2021-07-29 PROCEDURE — 71045 X-RAY EXAM CHEST 1 VIEW: CPT
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
## 2021-09-22 ENCOUNTER — HOSPITAL ENCOUNTER (EMERGENCY)
Dept: HOSPITAL 75 - ER | Age: 61
Discharge: HOME | End: 2021-09-22
Payer: COMMERCIAL

## 2021-09-22 VITALS — BODY MASS INDEX: 28.69 KG/M2 | HEIGHT: 70 IN | WEIGHT: 200.4 LBS

## 2021-09-22 VITALS — DIASTOLIC BLOOD PRESSURE: 98 MMHG | SYSTOLIC BLOOD PRESSURE: 151 MMHG

## 2021-09-22 DIAGNOSIS — S29.012A: ICD-10-CM

## 2021-09-22 DIAGNOSIS — Z91.19: ICD-10-CM

## 2021-09-22 DIAGNOSIS — I25.2: ICD-10-CM

## 2021-09-22 DIAGNOSIS — G89.29: ICD-10-CM

## 2021-09-22 DIAGNOSIS — Z87.820: ICD-10-CM

## 2021-09-22 DIAGNOSIS — I10: ICD-10-CM

## 2021-09-22 DIAGNOSIS — S39.012A: ICD-10-CM

## 2021-09-22 DIAGNOSIS — Z79.899: ICD-10-CM
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
## 2021-09-22 DIAGNOSIS — E78.00: ICD-10-CM

## 2021-09-22 DIAGNOSIS — S16.1XXA: Primary | ICD-10-CM

## 2021-09-22 DIAGNOSIS — J44.9: ICD-10-CM

## 2021-09-22 DIAGNOSIS — E11.9: ICD-10-CM

## 2021-09-22 DIAGNOSIS — I25.10: ICD-10-CM

## 2021-09-22 DIAGNOSIS — Z79.82: ICD-10-CM

## 2021-09-22 PROCEDURE — 72128 CT CHEST SPINE W/O DYE: CPT

## 2021-09-22 PROCEDURE — 72125 CT NECK SPINE W/O DYE: CPT

## 2021-09-22 PROCEDURE — 72131 CT LUMBAR SPINE W/O DYE: CPT
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
## 2021-10-18 ENCOUNTER — HOSPITAL ENCOUNTER (EMERGENCY)
Dept: HOSPITAL 75 - ER | Age: 61
Discharge: HOME | End: 2021-10-18
Payer: COMMERCIAL

## 2021-10-18 VITALS — BODY MASS INDEX: 34.88 KG/M2 | HEIGHT: 70.87 IN | WEIGHT: 249.12 LBS

## 2021-10-18 VITALS — SYSTOLIC BLOOD PRESSURE: 209 MMHG | DIASTOLIC BLOOD PRESSURE: 129 MMHG

## 2021-10-18 DIAGNOSIS — R52: Primary | ICD-10-CM

## 2021-10-18 DIAGNOSIS — J44.9: ICD-10-CM

## 2021-10-18 DIAGNOSIS — Z87.820: ICD-10-CM

## 2021-10-18 DIAGNOSIS — E78.00: ICD-10-CM

## 2021-10-18 DIAGNOSIS — I25.2: ICD-10-CM

## 2021-10-18 DIAGNOSIS — E11.9: ICD-10-CM

## 2021-10-18 DIAGNOSIS — Z79.899: ICD-10-CM

## 2021-10-18 DIAGNOSIS — I10: ICD-10-CM

## 2021-10-18 DIAGNOSIS — I25.10: ICD-10-CM

## 2021-10-18 DIAGNOSIS — Z79.82: ICD-10-CM

## 2021-10-18 PROCEDURE — 71045 X-RAY EXAM CHEST 1 VIEW: CPT
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
## 2022-01-23 ENCOUNTER — HOSPITAL ENCOUNTER (EMERGENCY)
Dept: HOSPITAL 75 - ER | Age: 62
Discharge: HOME | End: 2022-01-23
Payer: MEDICARE

## 2022-01-23 VITALS — WEIGHT: 215.39 LBS | BODY MASS INDEX: 31.9 KG/M2 | HEIGHT: 68.9 IN

## 2022-01-23 VITALS — DIASTOLIC BLOOD PRESSURE: 100 MMHG | SYSTOLIC BLOOD PRESSURE: 187 MMHG

## 2022-01-23 DIAGNOSIS — I10: ICD-10-CM

## 2022-01-23 DIAGNOSIS — Z79.899: ICD-10-CM

## 2022-01-23 DIAGNOSIS — E78.00: ICD-10-CM

## 2022-01-23 DIAGNOSIS — Z79.891: ICD-10-CM

## 2022-01-23 DIAGNOSIS — G89.29: ICD-10-CM

## 2022-01-23 DIAGNOSIS — I25.2: ICD-10-CM

## 2022-01-23 DIAGNOSIS — Z79.82: ICD-10-CM

## 2022-01-23 DIAGNOSIS — Z87.820: ICD-10-CM

## 2022-01-23 DIAGNOSIS — I25.10: ICD-10-CM

## 2022-01-23 DIAGNOSIS — M54.9: ICD-10-CM

## 2022-01-23 DIAGNOSIS — M54.12: Primary | ICD-10-CM

## 2022-01-23 DIAGNOSIS — J44.9: ICD-10-CM

## 2022-01-23 PROCEDURE — 99284 EMERGENCY DEPT VISIT MOD MDM: CPT
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
## 2022-04-24 ENCOUNTER — HOSPITAL ENCOUNTER (EMERGENCY)
Dept: HOSPITAL 75 - ER | Age: 62
Discharge: HOME | End: 2022-04-24
Payer: MEDICARE

## 2022-04-24 VITALS — BODY MASS INDEX: 34.48 KG/M2 | WEIGHT: 232.81 LBS | HEIGHT: 68.98 IN

## 2022-04-24 VITALS — SYSTOLIC BLOOD PRESSURE: 153 MMHG | DIASTOLIC BLOOD PRESSURE: 116 MMHG

## 2022-04-24 DIAGNOSIS — K46.9: Primary | ICD-10-CM

## 2022-04-24 LAB
ALBUMIN SERPL-MCNC: 4.3 GM/DL (ref 3.2–4.5)
ALP SERPL-CCNC: 101 U/L (ref 40–136)
ALT SERPL-CCNC: 42 U/L (ref 0–55)
APTT PPP: YELLOW S
BACTERIA #/AREA URNS HPF: (no result) /HPF
BASOPHILS # BLD AUTO: 0 10^3/UL (ref 0–0.1)
BASOPHILS NFR BLD AUTO: 0 % (ref 0–10)
BILIRUB SERPL-MCNC: 0.5 MG/DL (ref 0.1–1)
BILIRUB UR QL STRIP: NEGATIVE
BUN/CREAT SERPL: 11
CALCIUM SERPL-MCNC: 8.9 MG/DL (ref 8.5–10.1)
CHLORIDE SERPL-SCNC: 101 MMOL/L (ref 98–107)
CO2 SERPL-SCNC: 25 MMOL/L (ref 21–32)
CREAT SERPL-MCNC: 0.94 MG/DL (ref 0.6–1.3)
EOSINOPHIL # BLD AUTO: 0.2 10^3/UL (ref 0–0.3)
EOSINOPHIL NFR BLD AUTO: 2 % (ref 0–10)
FIBRINOGEN PPP-MCNC: CLEAR MG/DL
GFR SERPLBLD BASED ON 1.73 SQ M-ARVRAT: 92 ML/MIN
GLUCOSE SERPL-MCNC: 104 MG/DL (ref 70–105)
GLUCOSE UR STRIP-MCNC: NEGATIVE MG/DL
HCT VFR BLD CALC: 48 % (ref 40–54)
HGB BLD-MCNC: 16 G/DL (ref 13.3–17.7)
KETONES UR QL STRIP: NEGATIVE
LEUKOCYTE ESTERASE UR QL STRIP: NEGATIVE
LIPASE SERPL-CCNC: 20 U/L (ref 8–78)
LYMPHOCYTES # BLD AUTO: 2 10^3/UL (ref 1–4)
LYMPHOCYTES NFR BLD AUTO: 22 % (ref 12–44)
MANUAL DIFFERENTIAL PERFORMED BLD QL: NO
MCH RBC QN AUTO: 29 PG (ref 25–34)
MCHC RBC AUTO-ENTMCNC: 33 G/DL (ref 32–36)
MCV RBC AUTO: 88 FL (ref 80–99)
MONOCYTES # BLD AUTO: 0.6 10^3/UL (ref 0–1)
MONOCYTES NFR BLD AUTO: 6 % (ref 0–12)
NEUTROPHILS # BLD AUTO: 6.2 10^3/UL (ref 1.8–7.8)
NEUTROPHILS NFR BLD AUTO: 68 % (ref 42–75)
NITRITE UR QL STRIP: NEGATIVE
PH UR STRIP: 6 [PH] (ref 5–9)
PLATELET # BLD: 264 10^3/UL (ref 130–400)
PMV BLD AUTO: 9.7 FL (ref 9–12.2)
POTASSIUM SERPL-SCNC: 4.6 MMOL/L (ref 3.6–5)
PROT SERPL-MCNC: 7.4 GM/DL (ref 6.4–8.2)
PROT UR QL STRIP: NEGATIVE
RBC #/AREA URNS HPF: (no result) /HPF
SODIUM SERPL-SCNC: 139 MMOL/L (ref 135–145)
SP GR UR STRIP: 1.02 (ref 1.02–1.02)
WBC # BLD AUTO: 9.1 10^3/UL (ref 4.3–11)
WBC #/AREA URNS HPF: (no result) /HPF

## 2022-04-24 PROCEDURE — 85025 COMPLETE CBC W/AUTO DIFF WBC: CPT

## 2022-04-24 PROCEDURE — 81000 URINALYSIS NONAUTO W/SCOPE: CPT

## 2022-04-24 PROCEDURE — 36415 COLL VENOUS BLD VENIPUNCTURE: CPT

## 2022-04-24 PROCEDURE — 74177 CT ABD & PELVIS W/CONTRAST: CPT

## 2022-04-24 PROCEDURE — 83690 ASSAY OF LIPASE: CPT

## 2022-04-24 PROCEDURE — 80053 COMPREHEN METABOLIC PANEL: CPT

## 2022-04-24 PROCEDURE — 96375 TX/PRO/DX INJ NEW DRUG ADDON: CPT

## 2022-04-24 PROCEDURE — 96374 THER/PROPH/DIAG INJ IV PUSH: CPT

## 2022-04-24 NOTE — ED ABDOMINAL PAIN
General


Chief Complaint:  Abdominal/GI Problems


Stated Complaint:  HERNIA PAIN


Source of Information:  Patient


Exam Limitations:  No Limitations


 (SHAUN MAE)





History of Present Illness


Date Seen by Provider:  Apr 24, 2022


Time Seen by Provider:  13:59


Initial Comments


Patient is a 62-year-old male who presents ED with right groin pain right lower 

quadrant pain.  Symptoms started 2 to 3 hours ago with a sharp pain with 

radiation into his groin.  Noticed a bulge.  Concerning for hernia.  Denies any 

vomiting, diarrhea.  Patient reports inguinal hernia repair in the past.  Denies

of any pain with urination frequent urination hematuria, fever, chills.  Denies 

of any scrotum pain scrotum swelling.  Denies taking thing for pain at home.


 (SHAUN MAE)





Allergies and Home Medications


Allergies


Coded Allergies:  


     No Known Drug Allergies (Unverified , 9/21/11)





Patient Home Medication List


Home Medication List Reviewed:  Yes


 (SHAUN MAE)


Aspirin (Aspirin EC) 325 Mg Tablet.dr, 325 MG PO DAILY


   Prescribed by: ARJUN JACOBO on 12/30/15 1008


Atorvastatin Calcium (Atorvastatin Calcium) 10 Mg Tablet, 10 MG PO HS


   Prescribed by: ARJUN JACOBO on 12/30/15 1008


Cefuroxime Axetil (Cefuroxime) 250 Mg Tablet, 250 MG PO BID


   Prescribed by: JOYA JETER on 12/30/18 1336


Cyclobenzaprine HCl (Cyclobenzaprine HCl) 10 Mg Tablet, 10 MG PO Q8H PRN for 

SPASMS


   Prescribed by: JOSE RENDON on 9/22/21 1243


Hydrocodone/Acetaminophen (Hydrocodone-Acetamin 5-325 mg) 1 Each Tablet, 1 TAB 

PO Q4H PRN for PAIN-MODERATE (5-7)


   Prescribed by: JOYA JETER on 1/23/22 1407


Lisinopril (Lisinopril) 10 Mg Tablet, 10 MG PO DAILY


   Prescribed by: ARJUN JACOBO on 12/30/15 1009


Lisinopril (Lisinopril) 20 Mg Tablet, 20 MG PO DAILY


   Prescribed by: JOYA JETER on 7/29/21 1646


Lisinopril (Lisinopril) 20 Mg Tablet, 20 MG PO DAILY


   Prescribed by: SHAUN RHODES on 10/18/21 1749


Metoclopramide HCl (Reglan) 10 Mg Tablet, 10 MG PO TID


   Prescribed by: JOYA JETER on 12/30/18 1426


Metoprolol Succinate (Metoprolol Succinate) 25 Mg Tab.er.24h, 25 MG PO DAILY


   Prescribed by: ARJUN JACOBO on 12/30/15 1008


Metoprolol Succinate (Metoprolol Succinate) 25 Mg Tab.er.24h, 25 MG PO DAILY


   Prescribed by: JOYA JETER on 7/29/21 1646


Metoprolol Succinate (Metoprolol Succinate) 25 Mg Tab.er.24h, 25 MG PO DAILY


   Prescribed by: SAHUN RHODES on 10/18/21 1749


Naproxen (Naproxen) 500 Mg Tablet.dr, 500 MG PO BID


   Prescribed by: JOSE RENDON on 9/22/21 1243





Review of Systems


Review of Systems


Constitutional:  No chills, No diaphoresis, No malaise, No weakness


EENTM:  No Blurred Vision, No Eye Pain


Respiratory:  Denies Cough, Denies Orthopnea, Denies Shortness of Air, Denies 

SOA With Exertion, Denies SOA at Rest


Cardiovascular:  Denies Chest Pain, Denies Edema, Denies Irregular Heart Rate


Gastrointestinal:  Abdominal Pain; Denies Constipated, Denies Diarrhea, Denies 

Difficulty Swallowing, Denies Vomiting


Genitourinary:  Denies Burning, Denies Discharge


Musculoskeletal:  No back pain, No joint pain, No joint swelling, No muscle pain


Skin:  No change in color, No change in hair/nails


Psychiatric/Neurological:  Denies Anxiety, Denies Depressed (SHAUN MAE)





All Other Systems Reviewed


Negative Unless Noted:  Yes


 (SHAUN MAE)





Past Medical-Social-Family Hx


Immunizations Up To Date


Tetanus Booster (TDap):  More than 5yrs


PED Vaccines UTD:  No


First/Initial COVID19 Vaccinat:  UNSURE OF DATE


Second COVID19 Vaccination David:  UNSURE OF DATE.


 (SHAUN MAE)





Seasonal Allergies


Seasonal Allergies:  No


 (SHAUN MAE)





Past Medical History


Surgeries:  Yes (CARDIAC STENTS X4, STENT IN R LEG- PLACED AT [place], RIGHT ING

UINAL HERNIA)


Abdominal, Appendectomy, Cardiac, Coronary Stent, Gallbladder, Vascular Surgery


Respiratory:  Yes


COPD


Cardiac:  Yes (STENTS X2, TACHYCARDIA)


Coronary Artery Disease, Heart Attack, High Cholesterol, Hypertension


Neurological:  Yes (HEAD INJURY)


Concussion, Headaches /Migraines


Reproductive Disorders:  No


Gastrointestinal:  Yes


Gastroesophageal Reflux


Musculoskeletal:  Yes (CHRONIC NECK AND BACK PAIN)


Chronic Back Pain


Endocrine:  Yes (Pt states he "is not a diabetic" but takes Metformin)


Diabetes, Non-Insulin dep


HEENT:  No


Cancer:  No


Psychosocial:  Yes (? UNDETERMINED PYSCH HX)


Anxiety, Depression


Integumentary:  No


Blood Disorders:  No


Adverse Reaction/Blood Tranf:  No


 (SHAUN MAE)





Family Medical History


No Pertinent Family Hx


 (SHANU MAE)





Physical Exam


Vital Signs





Vital Signs - First Documented








 4/24/22





 13:52


 


Temp 36.8


 


Pulse 92


 


Resp 22


 


B/P (MAP) 174/99 (124)


 


Pulse Ox 96








 (LUIS FERNANDO ZULUAGA MD)


Vital Signs


Capillary Refill :  


 (SHAUN MAE)


Height/Weight/BMI


Height: 6'1.00"


Weight: 210lbs. 0.0oz. [id number]; 31.00 BMI


Method:Stated


General Appearance:  WD/WN, no apparent distress


HEENT:  PERRL/EOMI, normal ENT inspection, TMs normal, pharynx normal


Neck:  non-tender, full range of motion, supple


Respiratory:  chest non-tender, lungs clear, normal breath sounds, no 

respiratory distress, no accessory muscle use


Cardiovascular:  regular rate, rhythm, no edema, no gallop, no JVD


Gastrointestinal:  normal bowel sounds, soft, no organomegaly, tenderness (Right

lower quadrant right groin tenderness.  Small bulge noted.  No skin color 

changes.)


Extremities:  normal range of motion, non-tender, normal inspection, no pedal 

edema


Back:  normal inspection, no CVA tenderness, no vertebral tenderness


Neurologic/Psychiatric:  CNs II-XII nml as tested, no motor/sensory deficits, 

alert, normal mood/affect, oriented x 3


Skin:  normal color, warm/dry (SHAUN MAE)





Progress/Results/Core Measures


Results/Orders


Lab Results





Laboratory Tests








Test


 4/24/22


14:00 Range/Units


 


 


White Blood Count


 9.1 


 4.3-11.0


10^3/uL


 


Red Blood Count


 5.47 


 4.30-5.52


10^6/uL


 


Hemoglobin 16.0  13.3-17.7  g/dL


 


Hematocrit 48  40-54  %


 


Mean Corpuscular Volume 88  80-99  fL


 


Mean Corpuscular Hemoglobin 29  25-34  pg


 


Mean Corpuscular Hemoglobin


Concent 33 


 32-36  g/dL





 


Red Cell Distribution Width 12.6  10.0-14.5  %


 


Platelet Count


 264 


 130-400


10^3/uL


 


Mean Platelet Volume 9.7  9.0-12.2  fL


 


Immature Granulocyte % (Auto) 1   %


 


Neutrophils (%) (Auto) 68  42-75  %


 


Lymphocytes (%) (Auto) 22  12-44  %


 


Monocytes (%) (Auto) 6  0-12  %


 


Eosinophils (%) (Auto) 2  0-10  %


 


Basophils (%) (Auto) 0  0-10  %


 


Neutrophils # (Auto)


 6.2 


 1.8-7.8


10^3/uL


 


Lymphocytes # (Auto)


 2.0 


 1.0-4.0


10^3/uL


 


Monocytes # (Auto)


 0.6 


 0.0-1.0


10^3/uL


 


Eosinophils # (Auto)


 0.2 


 0.0-0.3


10^3/uL


 


Basophils # (Auto)


 0.0 


 0.0-0.1


10^3/uL


 


Immature Granulocyte # (Auto)


 0.1 


 0.0-0.1


10^3/uL


 


Urine Color YELLOW   


 


Urine Clarity CLEAR   


 


Urine pH 6.0  5-9  


 


Urine Specific Gravity 1.025 H 1.016-1.022  


 


Urine Protein NEGATIVE  NEGATIVE  


 


Urine Glucose (UA) NEGATIVE  NEGATIVE  


 


Urine Ketones NEGATIVE  NEGATIVE  


 


Urine Nitrite NEGATIVE  NEGATIVE  


 


Urine Bilirubin NEGATIVE  NEGATIVE  


 


Urine Urobilinogen 0.2  < = 1.0  MG/DL


 


Urine Leukocyte Esterase NEGATIVE  NEGATIVE  


 


Urine RBC (Auto) NEGATIVE  NEGATIVE  


 


Urine RBC NONE   /HPF


 


Urine WBC RARE   /HPF


 


Urine Crystals NONE   /LPF


 


Urine Bacteria TRACE   /HPF


 


Urine Casts NONE   /LPF


 


Urine Mucus NEGATIVE   /LPF


 


Urine Culture Indicated NO   


 


Sodium Level 139  135-145  MMOL/L


 


Potassium Level 4.6  3.6-5.0  MMOL/L


 


Chloride Level 101    MMOL/L


 


Carbon Dioxide Level 25  21-32  MMOL/L


 


Anion Gap 13  5-14  MMOL/L


 


Blood Urea Nitrogen 10  7-18  MG/DL


 


Creatinine


 0.94 


 0.60-1.30


MG/DL


 


Estimat Glomerular Filtration


Rate 92 


  





 


BUN/Creatinine Ratio 11   


 


Glucose Level 104    MG/DL


 


Calcium Level 8.9  8.5-10.1  MG/DL


 


Corrected Calcium 8.7  8.5-10.1  MG/DL


 


Total Bilirubin 0.5  0.1-1.0  MG/DL


 


Aspartate Amino Transf


(AST/SGOT) 35 H


 5-34  U/L





 


Alanine Aminotransferase


(ALT/SGPT) 42 


 0-55  U/L





 


Alkaline Phosphatase 101    U/L


 


Total Protein 7.4  6.4-8.2  GM/DL


 


Albumin 4.3  3.2-4.5  GM/DL


 


Lipase 20  8-78  U/L





 (LUIS FERNANDO ZULUAGA MD)


Medications Given in ED





Current Medications








 Medications  Dose


 Ordered  Sig/Mario


 Route  Start Time


 Stop Time Status Last Admin


Dose Admin


 


 Acetaminophen/


 Hydrocodone Bitart  1 ea  ONCE  ONCE


 PO  4/24/22 15:30


 4/24/22 15:31 DC 4/24/22 15:35


1 EA


 


 Iohexol  100 ml  ONCE  ONCE


 IV  4/24/22 14:30


 4/24/22 14:31 DC 4/24/22 14:32


100 ML


 


 Morphine Sulfate  4 mg  ONCE  ONCE


 IVP  4/24/22 14:00


 4/24/22 14:01 DC 4/24/22 14:05


4 MG


 


 Ondansetron HCl  4 mg  ONCE  ONCE


 IVP  4/24/22 15:00


 4/24/22 15:01 DC 4/24/22 15:08


4 MG


 


 Sodium Chloride  100 ml  ONCE  ONCE


 IV  4/24/22 14:30


 4/24/22 14:31 DC 4/24/22 14:32


80 ML





 (LUIS FERNANDO ZULUAGA MD)


Vital Signs/I&O











 4/24/22 4/24/22





 13:52 15:15


 


Temp 36.8 


 


Pulse 92 85


 


Resp 22 


 


B/P (MAP) 174/99 (124) 153/116


 


Pulse Ox 96 





 (LUIS FERNANDO ZULUAGA MD)





Departure


Communication (PCP)


Patient presents ED with right lower groin pain.  Does have a notable inguinal 

hernia reducible.  No urinary symptoms.  Urinalysis negative for infection.  Lab

work was otherwise unremarkable here.  Patient was given a dose of pain 

medication secondary to the pain.  CT of the pelvis shows a right and left 

inguinal adipose hernia without bowel.  Larger hernia on the right.  Similar 

type appearance since 2013.  This appears to be where patient is tender.  No 

vomiting or diarrhea.  History of cholecystectomy and appendectomy.  Patient 

will is a poor historian.  Initially he states he only had a left inguinal 

hernia repair.  Patient is not able to tell me the surgeon.  Patient was given 

another dose of pain medication secondary to the pain.  Patient was discussed 

with Dr. Fletcher secondary to the continuous pain recommends following up in the 

office with a few days worth of pain medication.  This was discussed with 

patient and friend at bedside.  Return precautions were discussed with patient.


 (SHAUN MAE)





Impression





   Primary Impression:  


   Inguinal hernia


Disposition:  01 HOME, SELF-CARE


Condition:  Stable





Departure-Patient Inst.


Decision time for Depature:  15:26


 (SHAUN MAE)


Referrals:  


[facility] [place] ([place]P/Family)


Primary Care Physician








JERSEY FLETCHER DO


Patient Instructions:  Inguinal and Femoral (Groin) Hernias








ATTENDING PHYSICIAN NOTE:


I was physically present as attending physician in the emergency department 

during the care of this patient, but I was not directly involved in the decision

making or delivery of care for this patient. 


 (LUIS FERNANDO ZULUAGA MD)











SHAUN MAE          Apr 24, 2022 14:02


LUIS FERNANDO ZULUAGA MD        Apr 24, 2022 19:18

## 2022-04-24 NOTE — DIAGNOSTIC IMAGING REPORT
PROCEDURE: CT abdomen and pelvis with contrast.



TECHNIQUE: Multiple contiguous axial images were obtained through

the abdomen and pelvis after administration of intravenous

contrast. Auto Exposure Controls were utilized during the CT exam

to meet ALARA standards for radiation dose reduction. All CT

scans use one or more of the following dose optimizing

techniques: automated exposure control, MA and/or KvP adjustment

based on patient size and exam type or iterative reconstruction.



INDICATION: "Hernia pain" began 2 days ago, worsened recently.

Has had previous hernia repairs on the left. Now the complaints

are predominantly in the right lower quadrant and groin region. 



COMPARISON: 05/06/2013.



FINDINGS: Bilateral fatty inguinal hernias are unchanged in

magnitude from prior. No herniation of viscus. No fluid within

either fatty hernia sac. Tissue densities within the sacs,

unchanged. No acute or focal inflammatory process. The rectus

sheath is intact. No diastasis. No acute abdominal wall

pathology. This patient has had previous appendectomy. There is

diverticulosis of the sigmoid colon but no convincing evidence

for acute diverticulitis. The bladder is nearly empty limiting

its evaluation. The unobstructed kidneys appear nonacute with

simple benign renal cortical cysts on the right, chronic.

Gallbladder is surgically absent. The liver and bile ducts are

unremarkable. The spleen, adrenals and pancreas are unremarkable.

Aortoiliac and mesenteric vessels patent and nonaneurysmal. There

is no findings of a bowel obstruction. No pneumatosis. No free

air. No acute bony pathology.



IMPRESSION:

1. Nonacute unobstructed urinary tracts. Benign renal cysts

noted. No stone disease.

2. This patient has chronic fatty bilateral inguinal hernias

greater right than left but identical in appearance to 2013. 

3. Sigmoid diverticulosis but no findings of diverticulitis. 

4. Prior cholecystectomy and appendectomy without apparent

complication. No inflammatory process or acute appearing

abdominopelvic abnormalities are found.



Dictated by: 



  Dictated on workstation # [id number]